# Patient Record
Sex: FEMALE | Race: WHITE | Employment: OTHER | URBAN - METROPOLITAN AREA
[De-identification: names, ages, dates, MRNs, and addresses within clinical notes are randomized per-mention and may not be internally consistent; named-entity substitution may affect disease eponyms.]

---

## 2023-11-13 ENCOUNTER — TELEPHONE (OUTPATIENT)
Dept: NEUROSURGERY | Facility: CLINIC | Age: 78
End: 2023-11-13
Payer: MEDICARE

## 2023-11-13 NOTE — TELEPHONE ENCOUNTER
----- Message from rElinda Casey MA sent at 11/13/2023  3:28 PM CST -----  Regarding: FW: Appt  Contact: Pt [phone]  Hi Ms. Shirley,    Can you call this patient to schedule she is looking for a 2nd opinion she has had a recent MRI/CT.    Thanks  ----- Message -----  From: Treva Rodas  Sent: 11/10/2023   4:59 PM CST  To: #  Subject: Appt                                             Pt is calling to schedule a new pt appt. Will have referral and clinic notes faxed over. Would like to schedule for 11/24 or 11/27-12/8. Please call [phone]

## 2023-11-13 NOTE — TELEPHONE ENCOUNTER
Called and spoke with pt. Instructed need to get a disc of imaging prior to scheduling appt. Address given to pt. Pt verbalized understanding.

## 2023-11-13 NOTE — TELEPHONE ENCOUNTER
----- Message from Treva Rodas sent at 11/10/2023  4:55 PM CST -----  Regarding: Appt  Contact: Pt [phone]  Pt is calling to schedule a new pt appt. Will have referral and clinic notes faxed over. Would like to schedule for 11/24 or 11/27-12/8. Please call [phone]    
Message sent to Dr. Chinchilla's nurse Eloisa.  
no chest pain, no cough, and no shortness of breath.

## 2023-11-17 ENCOUNTER — TELEPHONE (OUTPATIENT)
Dept: NEUROSURGERY | Facility: CLINIC | Age: 78
End: 2023-11-17
Payer: MEDICARE

## 2023-11-17 NOTE — TELEPHONE ENCOUNTER
----- Message from Nida Hoang MA sent at 11/17/2023  1:56 PM CST -----  Regarding: FW: Pt Advice  Contact: Pt [phone]  Km Shirley, please advise.Thank you!  ----- Message -----  From: Treva Rodas  Sent: 11/17/2023   1:56 PM CST  To: [facility] Staff  Subject: Pt Advice                                        Pt calling to check with nurse regarding information from doctors. Would like to speak with staff. Please call [phone]

## 2023-11-27 ENCOUNTER — TELEPHONE (OUTPATIENT)
Dept: NEUROSURGERY | Facility: CLINIC | Age: 78
End: 2023-11-27
Payer: MEDICARE

## 2023-11-28 ENCOUNTER — TELEPHONE (OUTPATIENT)
Dept: NEUROSURGERY | Facility: CLINIC | Age: 78
End: 2023-11-28
Payer: MEDICARE

## 2023-11-28 NOTE — TELEPHONE ENCOUNTER
----- Message from Angelica Conrad sent at 11/28/2023 11:02 AM CST -----  Regarding: pt advice  Contact: [phone]  Pt asking to speak to Eloisa or Fern. Pt would like to know if the papers from previous provider have come in. Pls call to discuss. Pt sent message yesterday with no response.

## 2023-11-29 ENCOUNTER — TELEPHONE (OUTPATIENT)
Dept: NEUROSURGERY | Facility: CLINIC | Age: 78
End: 2023-11-29
Payer: MEDICARE

## 2023-11-29 NOTE — TELEPHONE ENCOUNTER
Returned call to pt. Informed still have not received anything. She stated she was told they were mailed on 11/16. She stated they will mail again.

## 2023-11-29 NOTE — TELEPHONE ENCOUNTER
----- Message from Cristina Jimenez sent at 11/29/2023 11:59 AM CST -----  Regarding: pt call back  Contact: [phone]  Pt asking to speak to Eloisa or Fern. Pt would like to know if the papers from previous provider have come in. Pls call to discuss.

## 2023-12-11 ENCOUNTER — TELEPHONE (OUTPATIENT)
Dept: NEUROSURGERY | Facility: CLINIC | Age: 78
End: 2023-12-11
Payer: MEDICARE

## 2023-12-11 NOTE — TELEPHONE ENCOUNTER
----- Message from Allison Hunt sent at 12/11/2023 11:29 AM CST -----  Contact: @ [phone]  Pt called in regards to  speaking with someone in the office she has some question  .....Please call and adv @ [phone]

## 2023-12-11 NOTE — TELEPHONE ENCOUNTER
----- Message from Adolph Kat sent at 12/11/2023  2:15 PM CST -----   Name of Who is Calling:     What is the request in detail: request call  back in reference to verify referral has been received  Please contact to further discuss and advise      Can the clinic reply by [facility]:     [facility]  [facility] / primary care dr ramirez / [phone]

## 2023-12-11 NOTE — TELEPHONE ENCOUNTER
Returned call to pt. Explained we have received the paper work but not the disc. Pt stated she will go and pick the disc up herself and over night.

## 2023-12-18 ENCOUNTER — TELEPHONE (OUTPATIENT)
Dept: NEUROSURGERY | Facility: CLINIC | Age: 78
End: 2023-12-18
Payer: MEDICARE

## 2023-12-18 NOTE — TELEPHONE ENCOUNTER
----- Message from Lulu Mayers sent at 2023 12:04 PM CST -----  Regarding: Call back  Contact: [phone]  Pt is returning a missed call from someone in the office and is asking for a return call back soon. Thanks.         Reason for call:Pt missed call from Eloisa          Patient's DX:         Patient requesting call back or [facility] [phone]    
Returned call to pt. Left  no imaging received yet.   
dvt proph - HSQ

## 2023-12-18 NOTE — TELEPHONE ENCOUNTER
----- Message from Angelica Conrad sent at 12/18/2023  8:43 AM CST -----  Regarding: pt advice  Contact: [phone]  Pt asking to speak to Fern or Adrienne. Pls call to discuss.

## 2023-12-26 ENCOUNTER — TELEPHONE (OUTPATIENT)
Dept: NEUROSURGERY | Facility: CLINIC | Age: 78
End: 2023-12-26
Payer: MEDICARE

## 2023-12-26 NOTE — TELEPHONE ENCOUNTER
Called and spoke with pt. Informed date and time for the appt. Pt requested to be on the waiting list. Pt was put on the waiting list. Pt verbalized understanding

## 2024-01-03 ENCOUNTER — HOSPITAL ENCOUNTER (EMERGENCY)
Facility: HOSPITAL | Age: 79
Discharge: HOME | End: 2024-01-03
Attending: EMERGENCY MEDICINE
Payer: MEDICARE

## 2024-01-03 VITALS
DIASTOLIC BLOOD PRESSURE: 63 MMHG | TEMPERATURE: 99 F | HEIGHT: 55 IN | BODY MASS INDEX: 34.71 KG/M2 | SYSTOLIC BLOOD PRESSURE: 140 MMHG | WEIGHT: 150 LBS | RESPIRATION RATE: 27 BRPM | OXYGEN SATURATION: 99 % | HEART RATE: 114 BPM

## 2024-01-03 DIAGNOSIS — J06.9 VIRAL UPPER RESPIRATORY TRACT INFECTION WITH COUGH: ICD-10-CM

## 2024-01-03 DIAGNOSIS — J45.901 EXACERBATION OF ASTHMA, UNSPECIFIED ASTHMA SEVERITY, UNSPECIFIED WHETHER PERSISTENT: Primary | ICD-10-CM

## 2024-01-03 LAB
ALBUMIN SERPL BCP-MCNC: 3.6 G/DL (ref 3.5–5.2)
ALP SERPL-CCNC: 75 U/L (ref 55–135)
ALT SERPL W/O P-5'-P-CCNC: 18 U/L (ref 10–44)
ANION GAP SERPL CALC-SCNC: 12 MMOL/L (ref 8–16)
AST SERPL-CCNC: 18 U/L (ref 10–40)
BASOPHILS # BLD AUTO: 0.03 K/UL (ref 0–0.2)
BASOPHILS NFR BLD: 0.2 % (ref 0–1.9)
BILIRUB SERPL-MCNC: 0.9 MG/DL (ref 0.1–1)
BNP SERPL-MCNC: 204 PG/ML (ref 0–99)
BUN SERPL-MCNC: 10 MG/DL (ref 8–23)
CALCIUM SERPL-MCNC: 9.9 MG/DL (ref 8.7–10.5)
CHLORIDE SERPL-SCNC: 102 MMOL/L (ref 95–110)
CO2 SERPL-SCNC: 23 MMOL/L (ref 23–29)
CREAT SERPL-MCNC: 0.9 MG/DL (ref 0.5–1.4)
CTP QC/QA: YES
CTP QC/QA: YES
DIFFERENTIAL METHOD BLD: ABNORMAL
EOSINOPHIL # BLD AUTO: 0 K/UL (ref 0–0.5)
EOSINOPHIL NFR BLD: 0 % (ref 0–8)
ERYTHROCYTE [DISTWIDTH] IN BLOOD BY AUTOMATED COUNT: 13.3 % (ref 11.5–14.5)
EST. GFR  (NO RACE VARIABLE): >60 ML/MIN/1.73 M^2
GLUCOSE SERPL-MCNC: 187 MG/DL (ref 70–110)
HCT VFR BLD AUTO: 35.8 % (ref 37–48.5)
HGB BLD-MCNC: 12.2 G/DL (ref 12–16)
IMM GRANULOCYTES # BLD AUTO: 0.05 K/UL (ref 0–0.04)
IMM GRANULOCYTES NFR BLD AUTO: 0.3 % (ref 0–0.5)
LYMPHOCYTES # BLD AUTO: 1.6 K/UL (ref 1–4.8)
LYMPHOCYTES NFR BLD: 10.8 % (ref 18–48)
MCH RBC QN AUTO: 28.7 PG (ref 27–31)
MCHC RBC AUTO-ENTMCNC: 34.1 G/DL (ref 32–36)
MCV RBC AUTO: 84 FL (ref 82–98)
MONOCYTES # BLD AUTO: 1 K/UL (ref 0.3–1)
MONOCYTES NFR BLD: 6.5 % (ref 4–15)
NEUTROPHILS # BLD AUTO: 12.3 K/UL (ref 1.8–7.7)
NEUTROPHILS NFR BLD: 82.2 % (ref 38–73)
NRBC BLD-RTO: 0 /100 WBC
PLATELET # BLD AUTO: 351 K/UL (ref 150–450)
PMV BLD AUTO: 9.2 FL (ref 9.2–12.9)
POC MOLECULAR INFLUENZA A AGN: NEGATIVE
POC MOLECULAR INFLUENZA B AGN: NEGATIVE
POTASSIUM SERPL-SCNC: 3.8 MMOL/L (ref 3.5–5.1)
PROT SERPL-MCNC: 6.8 G/DL (ref 6–8.4)
RBC # BLD AUTO: 4.25 M/UL (ref 4–5.4)
SARS-COV-2 RDRP RESP QL NAA+PROBE: NEGATIVE
SODIUM SERPL-SCNC: 137 MMOL/L (ref 136–145)
TROPONIN I SERPL DL<=0.01 NG/ML-MCNC: 0.03 NG/ML (ref 0–0.03)
WBC # BLD AUTO: 14.97 K/UL (ref 3.9–12.7)

## 2024-01-03 PROCEDURE — 94644 CONT INHLJ TX 1ST HOUR: CPT

## 2024-01-03 PROCEDURE — 87635 SARS-COV-2 COVID-19 AMP PRB: CPT | Performed by: EMERGENCY MEDICINE

## 2024-01-03 PROCEDURE — 93005 ELECTROCARDIOGRAM TRACING: CPT

## 2024-01-03 PROCEDURE — 63600175 PHARM REV CODE 636 W HCPCS: Performed by: EMERGENCY MEDICINE

## 2024-01-03 PROCEDURE — 83880 ASSAY OF NATRIURETIC PEPTIDE: CPT | Performed by: EMERGENCY MEDICINE

## 2024-01-03 PROCEDURE — 25000242 PHARM REV CODE 250 ALT 637 W/ HCPCS: Performed by: EMERGENCY MEDICINE

## 2024-01-03 PROCEDURE — 84484 ASSAY OF TROPONIN QUANT: CPT | Performed by: EMERGENCY MEDICINE

## 2024-01-03 PROCEDURE — 93010 ELECTROCARDIOGRAM REPORT: CPT | Mod: ,,, | Performed by: INTERNAL MEDICINE

## 2024-01-03 PROCEDURE — 94761 N-INVAS EAR/PLS OXIMETRY MLT: CPT

## 2024-01-03 PROCEDURE — 85025 COMPLETE CBC W/AUTO DIFF WBC: CPT | Performed by: EMERGENCY MEDICINE

## 2024-01-03 PROCEDURE — 96374 THER/PROPH/DIAG INJ IV PUSH: CPT

## 2024-01-03 PROCEDURE — 99285 EMERGENCY DEPT VISIT HI MDM: CPT | Mod: 25

## 2024-01-03 PROCEDURE — 80053 COMPREHEN METABOLIC PANEL: CPT | Performed by: EMERGENCY MEDICINE

## 2024-01-03 PROCEDURE — 94640 AIRWAY INHALATION TREATMENT: CPT | Mod: XB

## 2024-01-03 PROCEDURE — 87502 INFLUENZA DNA AMP PROBE: CPT

## 2024-01-03 RX ORDER — EZETIMIBE 10 MG/1
10 TABLET ORAL DAILY
COMMUNITY

## 2024-01-03 RX ORDER — HYDROCHLOROTHIAZIDE 25 MG/1
25 TABLET ORAL DAILY
COMMUNITY

## 2024-01-03 RX ORDER — ROPINIROLE 1 MG/1
1 TABLET, FILM COATED ORAL 3 TIMES DAILY
COMMUNITY

## 2024-01-03 RX ORDER — ALBUTEROL SULFATE 0.83 MG/ML
2.5 SOLUTION RESPIRATORY (INHALATION) EVERY 6 HOURS PRN
Qty: 30 EACH | Refills: 2 | Status: SHIPPED | OUTPATIENT
Start: 2024-01-03 | End: 2025-01-02

## 2024-01-03 RX ORDER — ALBUTEROL SULFATE 2.5 MG/.5ML
15 SOLUTION RESPIRATORY (INHALATION)
Status: COMPLETED | OUTPATIENT
Start: 2024-01-03 | End: 2024-01-03

## 2024-01-03 RX ORDER — ALBUTEROL SULFATE 90 UG/1
2 AEROSOL, METERED RESPIRATORY (INHALATION) EVERY 6 HOURS PRN
COMMUNITY

## 2024-01-03 RX ORDER — DOXYCYCLINE 100 MG/1
100 CAPSULE ORAL 2 TIMES DAILY
Qty: 14 CAPSULE | Refills: 0 | Status: SHIPPED | OUTPATIENT
Start: 2024-01-03 | End: 2024-01-10

## 2024-01-03 RX ORDER — METHYLPREDNISOLONE SOD SUCC 125 MG
125 VIAL (EA) INJECTION
Status: COMPLETED | OUTPATIENT
Start: 2024-01-03 | End: 2024-01-03

## 2024-01-03 RX ORDER — ALBUTEROL SULFATE 2.5 MG/.5ML
2.5 SOLUTION RESPIRATORY (INHALATION)
Status: COMPLETED | OUTPATIENT
Start: 2024-01-03 | End: 2024-01-03

## 2024-01-03 RX ORDER — OLMESARTAN MEDOXOMIL 20 MG/1
40 TABLET ORAL DAILY
COMMUNITY

## 2024-01-03 RX ORDER — GABAPENTIN 300 MG/1
300 CAPSULE ORAL 3 TIMES DAILY
COMMUNITY

## 2024-01-03 RX ORDER — FLUTICASONE PROPIONATE AND SALMETEROL 500; 50 UG/1; UG/1
1 POWDER RESPIRATORY (INHALATION) 2 TIMES DAILY
COMMUNITY

## 2024-01-03 RX ORDER — METFORMIN HYDROCHLORIDE 500 MG/1
500 TABLET ORAL 2 TIMES DAILY WITH MEALS
COMMUNITY

## 2024-01-03 RX ORDER — GABAPENTIN 100 MG/1
100 CAPSULE ORAL 3 TIMES DAILY
COMMUNITY

## 2024-01-03 RX ORDER — FOLIC ACID 1 MG/1
1 TABLET ORAL DAILY
COMMUNITY

## 2024-01-03 RX ORDER — PREDNISONE 20 MG/1
40 TABLET ORAL DAILY
Qty: 6 TABLET | Refills: 0 | Status: SHIPPED | OUTPATIENT
Start: 2024-01-03 | End: 2024-01-06

## 2024-01-03 RX ADMIN — ALBUTEROL SULFATE 2.5 MG: 2.5 SOLUTION RESPIRATORY (INHALATION) at 05:01

## 2024-01-03 RX ADMIN — METHYLPREDNISOLONE SODIUM SUCCINATE 125 MG: 125 INJECTION, POWDER, FOR SOLUTION INTRAMUSCULAR; INTRAVENOUS at 06:01

## 2024-01-03 RX ADMIN — ALBUTEROL SULFATE 15 MG: 2.5 SOLUTION RESPIRATORY (INHALATION) at 07:01

## 2024-01-03 NOTE — ED NOTES
Continuous treatment in progress that was started by resp tech. Pt tolerating well, call bell within reach.

## 2024-01-03 NOTE — ED NOTES
Report rec'd, pt care assumed. Pt sitting up in bed, awake and alert. Pt continues with audible wheezes. MD at bedside for re eval. Pt calm and cooperative. Plan of care reviewed, call bell within reach.

## 2024-01-03 NOTE — ED NOTES
Pt given discharge  and follow up instructions as well as prescriptions. Pt verbalized understanding. Pt wheeled out to lobby where her granddaughter will be picking her up. Pt left ER in stable condition.

## 2024-01-03 NOTE — ED PROVIDER NOTES
[facility]  Emergency Department Provider Note    Myesha Bateman   78 y.o. female   [id number]      1/3/2024       History     This history was obtained from the patient without limitations.      She is a 78-year-old with a history of asthma, hypertension, diabetes, and restless leg syndrome who presents by personal transportation.  She developed productive cough with yellowish green sputum 2 days ago which she attributed to having a cold.  She has been around her granddaughters who are having similar symptoms and tested negative for influenza and COVID-19.  She began to feel short of breath last night and developed wheezing.  Her symptoms improved with albuterol MDI.  She has generalized chest pain only when she coughs.  She has subjective fever and chills.  She denies nausea, vomiting, and diarrhea.  She has not taken cough medications.  She is a nonsmoker.             No past medical history on file.   No past surgical history on file.   No family history on file.   Social History     Socioeconomic History    Marital status:       Review of patient's allergies indicates:  No Known Allergies        Physical Examination     Initial Vitals [01/03/24 0458]   BP Pulse Resp Temp SpO2   (!) 154/85 90 (!) 22 100.2 °F (37.9 °C) (!) 94 %      MAP       --           Physical Exam    Nursing note and vitals reviewed.  Constitutional: She is not diaphoretic. She appears distressed.   Neck: No JVD present.   Cardiovascular:  Normal rate, regular rhythm and normal heart sounds.           No murmur heard.  Pulmonary/Chest: No accessory muscle usage or stridor. Tachypnea noted. She has no decreased breath sounds. She has wheezes. She has no rhonchi.   Scattered bilateral end-expiratory wheezes.   Abdominal: Abdomen is soft. She exhibits no distension. There is no abdominal tenderness.   Musculoskeletal:         General: No edema.     Neurological: She is alert and oriented to person, place, and time. GCS score is 15. GCS  eye subscore is 4. GCS verbal subscore is 5. GCS motor subscore is 6.   Skin: Skin is warm and dry. No pallor.            Labs     Labs Reviewed   CBC W/ AUTO DIFFERENTIAL - Abnormal; Notable for the following components:       Result Value    WBC 14.97 (*)     Hematocrit 35.8 (*)     Gran # (ANC) 12.3 (*)     Immature Grans (Abs) 0.05 (*)     Gran % 82.2 (*)     Lymph % 10.8 (*)     All other components within normal limits   COMPREHENSIVE METABOLIC PANEL - Abnormal; Notable for the following components:    Glucose 187 (*)     All other components within normal limits   TROPONIN I - Abnormal; Notable for the following components:    Troponin I 0.029 (*)     All other components within normal limits   B-TYPE NATRIURETIC PEPTIDE - Abnormal; Notable for the following components:     (*)     All other components within normal limits   SARS-COV-2 RDRP GENE   POCT INFLUENZA A/B MOLECULAR        Imaging     Imaging Results              X-Ray Chest AP Portable (In process)                       ED Course     The patient received the following medications:  Medications   albuterol sulfate nebulizer solution 2.5 mg (2.5 mg Nebulization Given by Other 1/3/24 0557)   methylPREDNISolone sodium succinate injection 125 mg (125 mg Intravenous Given 1/3/24 0610)       Procedures    ED Course as of 01/03/24 0618   Wed Jan 03, 2024   0554 SARS-CoV-2 RNA, Amplification, Qual: Negative [LP]   0554 POC Molecular Influenza A Ag: Negative [LP]   0554 POC Molecular Influenza B Ag: Negative [LP]   0554 WBC(!): 14.97 [LP]   0555 EKG 12-lead  Independent interpretation:   Normal sinus rhythm. Ventricular rate 94 bpm.  Normal axis.  Normal QRS duration and QT interval.  No ST segment elevation or depression.  Normal T-wave morphology. Overall impression:  Normal EKG. [LP]   0615 Patient signed out to Dr. Frederick at shift change pending reassessment after breathing treatments. Likely discharge home on steroids, breathing treatments. [LP]       ED Course User Index  [LP] Jimi Johnson III, MD        Medical Decision Making                 Medical Decision Making  Differential diagnoses:  COVID-19 virus infection, influenza, pneumonia, asthma exacerbation    Amount and/or Complexity of Data Reviewed  Labs: ordered. Decision-making details documented in ED Course.  Radiology: ordered.  ECG/medicine tests:  Decision-making details documented in ED Course.    Risk  Prescription drug management.         Diagnoses       ICD-10-CM ICD-9-CM   1. Exacerbation of asthma, unspecified asthma severity, unspecified whether persistent  J45.901 493.92   2. Viral upper respiratory tract infection with cough  J06.9 465.9         Dispostion         Follow-up Information    None           Jimi Johnson III, MD  01/03/24 0618

## 2024-01-12 ENCOUNTER — TELEPHONE (OUTPATIENT)
Dept: NEUROSURGERY | Facility: CLINIC | Age: 79
End: 2024-01-12
Payer: MEDICARE

## 2024-01-29 ENCOUNTER — OFFICE VISIT (OUTPATIENT)
Dept: NEUROSURGERY | Facility: CLINIC | Age: 79
End: 2024-01-29
Payer: MEDICARE

## 2024-01-29 ENCOUNTER — TELEPHONE (OUTPATIENT)
Dept: NEUROSURGERY | Facility: CLINIC | Age: 79
End: 2024-01-29
Payer: MEDICARE

## 2024-01-29 DIAGNOSIS — I67.1 CEREBRAL ANEURYSM: Primary | ICD-10-CM

## 2024-01-29 PROCEDURE — 99205 OFFICE O/P NEW HI 60 MIN: CPT | Mod: 95,,, | Performed by: NEUROLOGICAL SURGERY

## 2024-01-29 NOTE — PROGRESS NOTES
Neurosurgery  History & Physical    SUBJECTIVE:     Chief Complaint:  Cerebral aneurysms    History of Present Illness:  Patient is a 78-year-old female, history of asthma, hypertension, previous pulmonary emboli, not on anticoagulation.  who had been having some pressure behind the ear.  She had a previous history of injections.  She underwent imaging, CTA of the head on 09/01/2023 which revealed a irregular appearing, wide necked MCA aneurysms.  Patient denies any history of smoking.  Although she did note that she smoked for very short.  In 1980s.  She is denies any family history.  She has a history of pulmonary emboli in both lungs without any history of anticoagulation use.  She does have some dizziness, she denies any headache, nausea.  Also complains of neuropathy in both hands.  She notes that she had a previous MRI, which she is unclear of the location that did not show a cerebral aneurysms proximally 2-3 years ago.  She does not have those imaging results available at this time.  This was a virtual audio visual visit.    Review of patient's allergies indicates:  No Known Allergies    Current Outpatient Medications   Medication Sig Dispense Refill    albuterol (PROVENTIL) 2.5 mg /3 mL (0.083 %) nebulizer solution Take 3 mLs (2.5 mg total) by nebulization every 6 (six) hours as needed for Wheezing or Shortness of Breath. Rescue 30 each 2    albuterol (VENTOLIN HFA) 90 mcg/actuation inhaler Inhale 2 puffs into the lungs every 6 (six) hours as needed for Wheezing. Rescue      ezetimibe (ZETIA) 10 mg tablet Take 10 mg by mouth once daily.      fluticasone-salmeterol diskus inhaler 500-50 mcg Inhale 1 puff into the lungs 2 (two) times daily. Controller      folic acid (FOLVITE) 1 MG tablet Take 1 mg by mouth once daily.      gabapentin (NEURONTIN) 100 MG capsule Take 100 mg by mouth 3 (three) times daily.      gabapentin (NEURONTIN) 300 MG capsule Take 300 mg by mouth 3 (three) times daily.       hydroCHLOROthiazide (HYDRODIURIL) 25 MG tablet Take 25 mg by mouth once daily.      metFORMIN (GLUCOPHAGE) 500 MG tablet Take 500 mg by mouth 2 (two) times daily with meals.      olmesartan (BENICAR) 20 MG tablet Take 40 mg by mouth once daily.      rOPINIRole (REQUIP) 1 MG tablet Take 1 mg by mouth 3 (three) times daily.       No current facility-administered medications for this visit.       No past medical history on file.  No past surgical history on file.  Family History    None       Social History     Socioeconomic History    Marital status:        Review of Systems    OBJECTIVE:     Vital Signs     There is no height or weight on file to calculate BMI.      Neurosurgery Physical Exam  On virtual audio visual visit   Head is normocephalic atraumatic   Neck is grossly supple  No appreciable labored breathing   Admitting appears to be nontender   Patient is able to move extremities     On virtual audio visual visit the patient's speech is fluent, goal directed without any noted dysarthria or aphasia   Unable to evaluate pupils on virtual audio visual visit   Face is symmetric   Tongue is midline  Unable to evaluate palate   Hearing appears to be intact on virtual audio visual visit to voice   Patient able to move both upper and lower extremities without any gross motor asymmetry on virtual audio visual visit     Diagnostic Results:  I personally reviewed the patient's Diagnostic Imaging.  There was an irregular appearing right MCA but MCA bifurcation aneurysms.    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 No appreciable deficits on virtual audio visual visit     2. 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 Recommend review of previous imaging.  I did note the patient again what the treatment options may be.  I did note that if this is in fact new aneurysms which was not previously  visualized on imaging 2-3 years ago I would strongly recommend treatment.  If aneurysms was present 2-3 years ago, conservative treatment may be appropriate verses MRA vessel wall imaging.  Had extensive discussion with the patient, around potential treatment options, in the need for obtaining previous imaging at the would directly impact recommendations for care.  I additionally answered all patient's questions her satisfaction.      Thank you so very much for allowing me to participate in the care of this patient.  Please feel free to call any questions, comments, or concerns.    Fran Chinchilla MD,MSc  Department of Neurosurgery   Department of Radiology  Department of Neurology  [facility]    [facility]   [employer] [facility] [employer] [facility]    Total time spent in counseling and discussion about further management options including relevant lab work, treatment,  prognosis, medications and intended side effects was more than 60 minutes. More than 50 % of the time was spent in counseling and coordination of care.  I spent a total of 60 minutes on the day of the visit.This includes face to face time and non-face to face time preparing to see the patient (eg, review of tests), Obtaining and/or reviewing separately obtained history, Documenting clinical information in the electronic or other health record, Independently interpreting resultsand communicating results to the patient/family/caregiver, or Care coordination     Note dictated with voice recognition software, please excuse any grammatical errors.

## 2024-01-29 NOTE — TELEPHONE ENCOUNTER
Called and spoke with pt. She stated she is seeing her provider in [place] on 02/07/24 and will get the disc for the MRI from 2 years ago. She wrote address down and will mail to clinic.

## 2024-02-09 ENCOUNTER — PATIENT MESSAGE (OUTPATIENT)
Dept: NEUROSURGERY | Facility: CLINIC | Age: 79
End: 2024-02-09
Payer: MEDICARE

## 2024-08-02 ENCOUNTER — TELEPHONE (OUTPATIENT)
Dept: NEUROSURGERY | Facility: CLINIC | Age: 79
End: 2024-08-02
Payer: MEDICARE

## 2024-08-02 NOTE — TELEPHONE ENCOUNTER
----- Message from Annalee Neri sent at 8/2/2024  2:46 PM CDT -----  Regarding: appt access  Contact: pt @ [phone]  Pt is advising that she is needing to be scheduled for an appt for a large aneurysm. Please call to advise further. Thank you for all you are doing.

## 2024-08-02 NOTE — TELEPHONE ENCOUNTER
Returned call to pt. Pt wanted to know if Dr. Chinchilla is going to treat her, will it be an invasive treatment or open surgery. Explained that she will need to see Dr. Chinchilla to know about that. Request the MRI 7/25/23 and Angiogram 2/27/2024 to be mailed to us. Pt stated that she already mailed the MRI to us. Informed that I have not received any disc. PT stated that she will see what she can do about the imaging and will let us know. Confirmed the address.

## 2024-08-13 ENCOUNTER — PATIENT MESSAGE (OUTPATIENT)
Dept: NEUROSURGERY | Facility: CLINIC | Age: 79
End: 2024-08-13
Payer: MEDICARE

## 2024-08-13 ENCOUNTER — TELEPHONE (OUTPATIENT)
Dept: NEUROSURGERY | Facility: CLINIC | Age: 79
End: 2024-08-13
Payer: MEDICARE

## 2024-08-13 DIAGNOSIS — I67.1 CEREBRAL ANEURYSM, NONRUPTURED: Primary | ICD-10-CM

## 2024-08-23 NOTE — TELEPHONE ENCOUNTER
Attempted to call pt in reference to appt. The number is not in service. Appt letter mailed. Disc mailed back

## 2024-09-17 ENCOUNTER — OFFICE VISIT (OUTPATIENT)
Dept: NEUROSURGERY | Facility: CLINIC | Age: 79
End: 2024-09-17
Payer: MEDICARE

## 2024-09-17 ENCOUNTER — HOSPITAL ENCOUNTER (OUTPATIENT)
Dept: RADIOLOGY | Facility: HOSPITAL | Age: 79
Discharge: HOME | End: 2024-09-17
Attending: NEUROLOGICAL SURGERY
Payer: MEDICARE

## 2024-09-17 VITALS — DIASTOLIC BLOOD PRESSURE: 74 MMHG | HEART RATE: 74 BPM | SYSTOLIC BLOOD PRESSURE: 109 MMHG

## 2024-09-17 DIAGNOSIS — I67.1 CEREBRAL ANEURYSM, NONRUPTURED: ICD-10-CM

## 2024-09-17 DIAGNOSIS — I67.1 CEREBRAL ANEURYSM, NONRUPTURED: Primary | ICD-10-CM

## 2024-09-17 PROCEDURE — 99212 OFFICE O/P EST SF 10 MIN: CPT | Mod: PBBFAC,25 | Performed by: NEUROLOGICAL SURGERY

## 2024-09-17 PROCEDURE — A9585 GADOBUTROL INJECTION: HCPCS | Performed by: NEUROLOGICAL SURGERY

## 2024-09-17 PROCEDURE — 25500020 PHARM REV CODE 255: Performed by: NEUROLOGICAL SURGERY

## 2024-09-17 PROCEDURE — 70546 MR ANGIOGRAPH HEAD W/O&W/DYE: CPT | Mod: TC

## 2024-09-17 PROCEDURE — 99999 PR PBB SHADOW E&M-EST. PATIENT-LVL II: CPT | Mod: PBBFAC,,, | Performed by: NEUROLOGICAL SURGERY

## 2024-09-17 PROCEDURE — 70546 MR ANGIOGRAPH HEAD W/O&W/DYE: CPT | Mod: 26,,, | Performed by: RADIOLOGY

## 2024-09-17 PROCEDURE — 99215 OFFICE O/P EST HI 40 MIN: CPT | Mod: S$PBB,,, | Performed by: NEUROLOGICAL SURGERY

## 2024-09-17 RX ORDER — AZELASTINE 1 MG/ML
1 SPRAY, METERED NASAL 2 TIMES DAILY
COMMUNITY

## 2024-09-17 RX ORDER — GADOBUTROL 604.72 MG/ML
7 INJECTION INTRAVENOUS
Status: COMPLETED | OUTPATIENT
Start: 2024-09-17 | End: 2024-09-17

## 2024-09-17 RX ORDER — LANOLIN ALCOHOL/MO/W.PET/CERES
100 CREAM (GRAM) TOPICAL DAILY
COMMUNITY

## 2024-09-17 RX ADMIN — GADOBUTROL 7 ML: 604.72 INJECTION INTRAVENOUS at 02:09

## 2024-09-17 NOTE — PROGRESS NOTES
Neurosurgery  Established Patient    SUBJECTIVE:     History of Present Illness:  Patient is a 78-year-old female, history of asthma, hypertension, previous pulmonary emboli, not on anticoagulation. who had been having some pressure behind the ear. She had a previous history of injections. She underwent imaging, CTA of the head on 09/01/2023 which revealed a irregular appearing, wide necked MCA aneurysms. Patient denies any history of smoking. Although she did note that she smoked for very short. In 1980s. She is denies any family history. She has a history of pulmonary emboli in both lungs without any history of anticoagulation use. She does have some dizziness, she denies any headache, nausea. Also complains of neuropathy in both hands. She notes that she had a previous MRI, which she is unclear of the location that did not show a cerebral aneurysms proximally 2-3 years ago. She does not have those imaging results available at this time. This was a virtual audio visual visit.       Interval history 09/17/2024:  Patient presents after in deference to review previous imaging.  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    Review of patient's allergies indicates:  No Known Allergies    Current Outpatient Medications   Medication Sig Dispense Refill    albuterol (VENTOLIN HFA) 90 mcg/actuation inhaler Inhale 2 puffs into the lungs every 6 (six) hours as needed for Wheezing. Rescue      azelastine (ASTELIN) 137 mcg (0.1 %) nasal spray 1 spray by Nasal route 2 (two) times daily.      cyanocobalamin (VITAMIN B-12) 1000 MCG tablet Take 100 mcg by mouth once daily.      ezetimibe (ZETIA) 10 mg tablet Take 10 mg by mouth once daily.      fluticasone-salmeterol diskus inhaler 500-50 mcg Inhale 1 puff into the lungs 2 (two) times daily. Controller      gabapentin (NEURONTIN) 100 MG capsule Take 100 mg by mouth 3 (three) times daily.      hydroCHLOROthiazide  (HYDRODIURIL) 25 MG tablet Take 25 mg by mouth once daily.      metFORMIN (GLUCOPHAGE) 500 MG tablet Take 500 mg by mouth 2 (two) times daily with meals.      olmesartan (BENICAR) 20 MG tablet Take 40 mg by mouth once daily.      albuterol (PROVENTIL) 2.5 mg /3 mL (0.083 %) nebulizer solution Take 3 mLs (2.5 mg total) by nebulization every 6 (six) hours as needed for Wheezing or Shortness of Breath. Rescue (Patient not taking: Reported on 9/17/2024) 30 each 2    folic acid (FOLVITE) 1 MG tablet Take 1 mg by mouth once daily.      gabapentin (NEURONTIN) 300 MG capsule Take 300 mg by mouth 3 (three) times daily.      rOPINIRole (REQUIP) 1 MG tablet Take 1 mg by mouth 3 (three) times daily.       No current facility-administered medications for this visit.       No past medical history on file.  No past surgical history on file.  Family History    None       Social History     Socioeconomic History    Marital status:      Social Determinants of Health     Financial Resource Strain: Low Risk  (2/1/2024)    Received from [facility]    Overall Financial Resource Strain (CARDIA)     Difficulty of Paying Living Expenses: Not very hard   Food Insecurity: No Food Insecurity (2/1/2024)    Received from [facility]    Hunger Vital Sign     Worried About Running Out of Food in the Last Year: Never true     Ran Out of Food in the Last Year: Never true   Transportation Needs: No Transportation Needs (2/1/2024)    Received from [facility]    [facility]     Lack of Transportation (Medical): No     Lack of Transportation (Non-Medical): No   Physical Activity: Inactive (2/1/2024)    Received from [facility]    Exercise Vital Sign     Days of Exercise per Week: 0 days     Minutes of Exercise per Session: 0 min   Stress: No Stress Concern Present (2/1/2024)    Received from [facility]  Center - [facility] - Occupational Stress Questionnaire     Feeling of Stress : Only a little    [facility] from [facility]    [facility] - Housing Stability       Review of Systems    OBJECTIVE:     Vital Signs  Pulse: 74  BP: 109/74  Pain Score: 0-No pain  There is no height or weight on file to calculate BMI.    Neurosurgery Physical Exam      Diagnostic Results:  I personally reviewed the patient's Diagnostic Imaging.  There was an irregular appearing right MCA but MCA bifurcation aneurysms.      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  However access to the inferior branch of aneurysms would be troublesome secondary to angles.  Likely unsuitable for intra saccular web device given the aneurysms morphology.  Likely most ideal would be microsurgical clip ligation.  Certainly cautious, given patient's underlying medical state.    Note dictated with voice recognition software, please excuse any grammatical errors.

## 2024-09-18 ENCOUNTER — TELEPHONE (OUTPATIENT)
Dept: NEUROSURGERY | Facility: CLINIC | Age: 79
End: 2024-09-18
Payer: MEDICARE

## 2024-09-18 NOTE — TELEPHONE ENCOUNTER
----- Message from Maddie Haynes sent at 9/18/2024 10:29 AM CDT -----  Regarding: Pt called wld like to speak with someone regarding scheduling surgery states she spoke with family on yesterday and has decided to go ahead with surgery  Contact: [phone]  Name of Who is Calling:DIANA HOU [[id number]]        What is the request in detail:Pt called wld like to speak with someone regarding scheduling surgery states she spoke with family on yesterday and has decided to go ahead with surgery. Please advise         Can the clinic reply by [facility]:No        What Number to Call Back if not in [facility]: Telephone Information:  Mobile          [phone] / Grand daughter [phone]

## 2024-09-18 NOTE — TELEPHONE ENCOUNTER
Called and spoke with pt. Pt does want to proceed with surgery in [place]. Explained I will need to get orders on Monday once Dr. Chinchilla is in clinic. Pt marlon.

## 2024-09-23 ENCOUNTER — TELEPHONE (OUTPATIENT)
Dept: NEUROSURGERY | Facility: CLINIC | Age: 79
End: 2024-09-23
Payer: MEDICARE

## 2024-09-23 NOTE — TELEPHONE ENCOUNTER
----- Message from Brandee Aj sent at 9/23/2024  3:57 PM CDT -----  Regarding: Call requested for Nurse Sg  Contact: [phone]  Hi, pt called to request a call from the nurse to discuss scheduling her surgery. Pls call the pt at  [phone].    Thank you.

## 2024-09-23 NOTE — TELEPHONE ENCOUNTER
Called and spoke with pt. Do not have date yet but probably will be November. Once I get the date will let her know.